# Patient Record
Sex: FEMALE | Race: WHITE | Employment: STUDENT | URBAN - METROPOLITAN AREA
[De-identification: names, ages, dates, MRNs, and addresses within clinical notes are randomized per-mention and may not be internally consistent; named-entity substitution may affect disease eponyms.]

---
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
## 2022-01-07 ENCOUNTER — HOSPITAL ENCOUNTER (OUTPATIENT)
Age: 22
Setting detail: SPECIMEN
Discharge: HOME | End: 2022-01-07
Payer: COMMERCIAL

## 2022-01-07 PROCEDURE — 81378 HLA I & II TYPING HR: CPT

## 2022-02-18 ENCOUNTER — HOSPITAL ENCOUNTER (OUTPATIENT)
Dept: ONCOLOGY | Age: 22
Setting detail: INFUSION SERIES
Discharge: HOME | End: 2022-02-18
Payer: COMMERCIAL

## 2022-02-18 VITALS
DIASTOLIC BLOOD PRESSURE: 76 MMHG | WEIGHT: 188.27 LBS | RESPIRATION RATE: 18 BRPM | HEIGHT: 66 IN | SYSTOLIC BLOOD PRESSURE: 132 MMHG | BODY MASS INDEX: 30.26 KG/M2 | TEMPERATURE: 98.3 F | OXYGEN SATURATION: 95 % | HEART RATE: 92 BPM

## 2022-02-18 LAB
A/G RATIO: 1.5 (ref 1.1–2.2)
ABO/RH: NORMAL
ALBUMIN SERPL-MCNC: 4.6 G/DL (ref 3.4–5)
ALP BLD-CCNC: 91 U/L (ref 40–129)
ALT SERPL-CCNC: 27 U/L (ref 10–40)
ANION GAP SERPL CALCULATED.3IONS-SCNC: 11 MMOL/L (ref 3–16)
ANTIBODY SCREEN: NORMAL
APTT: 32.6 SEC (ref 26.2–38.6)
AST SERPL-CCNC: 24 U/L (ref 15–37)
BASOPHILS ABSOLUTE: 0.1 K/UL (ref 0–0.2)
BASOPHILS RELATIVE PERCENT: 1 %
BILIRUB SERPL-MCNC: 0.3 MG/DL (ref 0–1)
BILIRUBIN DIRECT: <0.2 MG/DL (ref 0–0.3)
BILIRUBIN, INDIRECT: NORMAL MG/DL (ref 0–1)
BUN BLDV-MCNC: 15 MG/DL (ref 7–20)
CALCIUM SERPL-MCNC: 10.1 MG/DL (ref 8.3–10.6)
CHLORIDE BLD-SCNC: 103 MMOL/L (ref 99–110)
CO2: 24 MMOL/L (ref 21–32)
CREAT SERPL-MCNC: 0.6 MG/DL (ref 0.6–1.1)
EOSINOPHILS ABSOLUTE: 0.1 K/UL (ref 0–0.6)
EOSINOPHILS RELATIVE PERCENT: 1.7 %
GFR AFRICAN AMERICAN: >60
GFR NON-AFRICAN AMERICAN: >60
GLUCOSE BLD-MCNC: 120 MG/DL (ref 70–99)
HAV IGM SER IA-ACNC: NORMAL
HBV SURFACE AB TITR SER: <3.5 MIU/ML
HCG QUALITATIVE: NEGATIVE
HCT VFR BLD CALC: 39.8 % (ref 36–48)
HEMOGLOBIN: 13.9 G/DL (ref 12–16)
INR BLD: 1.11 (ref 0.88–1.12)
LACTATE DEHYDROGENASE: 221 U/L (ref 100–190)
LYMPHOCYTES ABSOLUTE: 2.2 K/UL (ref 1–5.1)
LYMPHOCYTES RELATIVE PERCENT: 37.1 %
MCH RBC QN AUTO: 30 PG (ref 26–34)
MCHC RBC AUTO-ENTMCNC: 34.8 G/DL (ref 31–36)
MCV RBC AUTO: 86.2 FL (ref 80–100)
MONOCYTES ABSOLUTE: 0.4 K/UL (ref 0–1.3)
MONOCYTES RELATIVE PERCENT: 6.9 %
NEUTROPHILS ABSOLUTE: 3.2 K/UL (ref 1.7–7.7)
NEUTROPHILS RELATIVE PERCENT: 53.3 %
PDW BLD-RTO: 12.5 % (ref 12.4–15.4)
PHOSPHORUS: 3.2 MG/DL (ref 2.5–4.9)
PLATELET # BLD: 256 K/UL (ref 135–450)
PMV BLD AUTO: 9.6 FL (ref 5–10.5)
POTASSIUM REFLEX MAGNESIUM: 3.9 MMOL/L (ref 3.5–5.1)
PROTHROMBIN TIME: 12.6 SEC (ref 9.9–12.7)
RBC # BLD: 4.62 M/UL (ref 4–5.2)
SODIUM BLD-SCNC: 138 MMOL/L (ref 136–145)
TOTAL PROTEIN: 7.6 G/DL (ref 6.4–8.2)
URIC ACID, SERUM: 4.1 MG/DL (ref 2.6–6)
WBC # BLD: 6 K/UL (ref 4–11)

## 2022-02-18 PROCEDURE — 86663 EPSTEIN-BARR ANTIBODY: CPT

## 2022-02-18 PROCEDURE — 86707 HEPATITIS BE ANTIBODY: CPT

## 2022-02-18 PROCEDURE — 84100 ASSAY OF PHOSPHORUS: CPT

## 2022-02-18 PROCEDURE — 86850 RBC ANTIBODY SCREEN: CPT

## 2022-02-18 PROCEDURE — 86778 TOXOPLASMA ANTIBODY IGM: CPT

## 2022-02-18 PROCEDURE — 86645 CMV ANTIBODY IGM: CPT

## 2022-02-18 PROCEDURE — 85660 RBC SICKLE CELL TEST: CPT

## 2022-02-18 PROCEDURE — 86709 HEPATITIS A IGM ANTIBODY: CPT

## 2022-02-18 PROCEDURE — 86694 HERPES SIMPLEX NES ANTBDY: CPT

## 2022-02-18 PROCEDURE — 81371 HLA I & II TYPE VERIFY LR: CPT

## 2022-02-18 PROCEDURE — 86787 VARICELLA-ZOSTER ANTIBODY: CPT

## 2022-02-18 PROCEDURE — 86833 HLA CLASS II HIGH DEFIN QUAL: CPT

## 2022-02-18 PROCEDURE — 83615 LACTATE (LD) (LDH) ENZYME: CPT

## 2022-02-18 PROCEDURE — 82248 BILIRUBIN DIRECT: CPT

## 2022-02-18 PROCEDURE — 85610 PROTHROMBIN TIME: CPT

## 2022-02-18 PROCEDURE — 86777 TOXOPLASMA ANTIBODY: CPT

## 2022-02-18 PROCEDURE — 86664 EPSTEIN-BARR NUCLEAR ANTIGEN: CPT

## 2022-02-18 PROCEDURE — 86900 BLOOD TYPING SEROLOGIC ABO: CPT

## 2022-02-18 PROCEDURE — 80053 COMPREHEN METABOLIC PANEL: CPT

## 2022-02-18 PROCEDURE — 85025 COMPLETE CBC W/AUTO DIFF WBC: CPT

## 2022-02-18 PROCEDURE — 86901 BLOOD TYPING SEROLOGIC RH(D): CPT

## 2022-02-18 PROCEDURE — 85730 THROMBOPLASTIN TIME PARTIAL: CPT

## 2022-02-18 PROCEDURE — 86706 HEP B SURFACE ANTIBODY: CPT

## 2022-02-18 PROCEDURE — 86832 HLA CLASS I HIGH DEFIN QUAL: CPT

## 2022-02-18 PROCEDURE — 84550 ASSAY OF BLOOD/URIC ACID: CPT

## 2022-02-18 PROCEDURE — 86665 EPSTEIN-BARR CAPSID VCA: CPT

## 2022-02-18 PROCEDURE — 84703 CHORIONIC GONADOTROPIN ASSAY: CPT

## 2022-02-18 NOTE — ONCOLOGY
RN met this donor in outpt infusion for teach regarding donation of stem cells to her grandfather. Donor was educated on process for collecting PBSC and donor harvest.   All questions were answered to her satisfaction. All lab work drawn blood samples urine sample. FDA donor panel and [place] labs. Transplant coordinator did discuss possibilty of donor needing to collect a auto unit of blood prior to a harvest.  TC will be in touch with donor re: future scheduling/plans.

## 2022-02-19 LAB
CYTOMEGALOVIRUS IGM ANTIBODY: <8 AU/ML
EPSTEIN BARR VIRUS NUCLEAR AB IGG: 246 U/ML (ref 0–21.9)
EPSTEIN-BARR EARLY ANTIGEN ANTIBODY: 11.2 U/ML (ref 0–10.9)
EPSTEIN-BARR VCA IGG: 310 U/ML (ref 0–21.9)
EPSTEIN-BARR VCA IGM: <10 U/ML (ref 0–43.9)
TOXOPLASMA GONDI AB IGM: <3 AU/ML
TOXOPLASMA GONDII AB IGG: <3 IU/ML
VARICELLA-ZOSTER VIRUS AB, IGG: NORMAL

## 2022-02-20 LAB
HEPATITIS BE ANTIBODY: NEGATIVE
HERPES TYPE 1/2 IGM COMBINED: 0.77 IV
VARICELLA ZOSTER AB IGM: 0.06 ISR

## 2022-02-21 LAB — MISCELLANEOUS LAB TEST ORDER: NORMAL

## 2022-02-25 LAB — HERPES TYPE I/II IGG COMBINED: 19.3 IV

## 2022-03-14 ENCOUNTER — HOSPITAL ENCOUNTER (OUTPATIENT)
Dept: ONCOLOGY | Age: 22
Setting detail: INFUSION SERIES
Discharge: HOME | End: 2022-03-14
Payer: COMMERCIAL

## 2022-03-14 LAB
BILIRUBIN URINE: NEGATIVE
BLOOD, URINE: NEGATIVE
CLARITY: CLEAR
COLOR: YELLOW
GLUCOSE URINE: NEGATIVE MG/DL
KETONES, URINE: NEGATIVE MG/DL
LEUKOCYTE ESTERASE, URINE: NEGATIVE
MICROSCOPIC EXAMINATION: NORMAL
NITRITE, URINE: NEGATIVE
PH UA: 6 (ref 5–8)
PROTEIN UA: NEGATIVE MG/DL
SPECIFIC GRAVITY UA: >=1.03 (ref 1–1.03)
URINE TYPE: NORMAL
UROBILINOGEN, URINE: 0.2 E.U./DL

## 2022-03-14 PROCEDURE — 81003 URINALYSIS AUTO W/O SCOPE: CPT

## 2022-03-17 NOTE — PROGRESS NOTES
Called [facility] and verified last vist for pt was 2-18-22. Called and left message for Berenice Ernst for H&P info. /MA    3/21 No new H&P received. [facility] for Berenice Ernst. Please fax H&P within 30 days or call to notify of plan for H&P. Be the Match Form - states Covid vacc. Hank Renteria    3/21 [address], from transplant Center called back. She stated patient has not and will not be seen in their office again before surgery. H&P to be done DOS per Berenice Ernst.  Hank Renteria

## 2022-03-17 NOTE — PROGRESS NOTES
Place patient label inside box (if no patient label, complete below)  Name:  :  MR#:             Smith Lopez / PROCEDURE  1. I (we), Magaly Nichols (Patient Name) authorize DR. FARRELL [address] [place] [address] (Provider / Earl Lovett) and/or such assistants as may be selected by him/her, to perform the following operation/procedure(s): BONE MARROW HARVEST       Note: If unable to obtain consent prior to an emergent procedure, document the emergent reason in the medical record. This procedure has been explained to my (our) satisfaction and included in the explanation was:  A) The intended benefit, nature, and extent of the procedure to be performed;  B) The significant risks involved and the probability of success;  C) Alternative procedures and methods of treatment;  D) The dangers and probable consequences of such alternatives (including no procedure or treatment); E) The expected consequences of the procedure on my future health;  F) Whether other qualified individuals would be performing important surgical tasks and/or whether  would be present to advise or support the procedure. I (we) understand that there are other risks of infection and other serious complications in the pre-operative/procedural and postoperative/procedural stages of my (our) care. I (we) have asked all of the questions which I (we) thought were important in deciding whether or not to undergo treatment or diagnosis. These questions have been answered to my (our) satisfaction. I (we) understand that no assurance can be given that the procedure will be a success, and no guarantee or warranty of success has been given to me (us). 2. It has been explained to me (us) that during the course of the operation/procedure, unforeseen conditions may be revealed that necessitate extension of the original procedure(s) or different procedure(s) than those set forth in Paragraph 1.  I (we) authorize and request that the above-named physician, his/her assistants or his/her designees, perform procedures as necessary and desirable if deemed to be in my (our) best interest.     Revised 8/2/2021                                                                          Page 1 of 2       3. I acknowledge that health care personnel may be observing this procedure for the purpose of medical education or other specified purposes as may be necessary as requested and/or approved by my (our) physician. 4. I (we) consent to the disposal by the hospital Pathologist of the removed tissue, parts or organs in accordance with hospital policy. 5. I do ____ do not ____ consent to the use of a local infiltration pain blocking agent that will be used by my provider/surgical provider to help alleviate pain during my procedure. 6. I do ____ do not ____ consent to an emergent blood transfusion in the case of a life-threatening situation that requires blood components to be administered. This consent is valid for 24 hours from the beginning of the procedure. 7. This patient does ____ or does not ____ currently have a DNR status/order. If DNR order is in place, obtain Addendum to the Surgical Consent for ALL Patients with a DNR Order to address kristy-operative status for limited intervention or DNR suspension.      8. I have read and fully understand the above Consent for Operation/Procedure and that all blanks were completed before I signed the consent.   _____________________________       _____________________      ____/____am/pm  Signature of Patient or legal representative      Printed Name / Relationship            Date / Time   ____________________________       _____________________      ____/____am/pm  Witness to Signature                                    Printed Name                    Date / Time     If patient is unable to sign or is a minor, complete the following)  Patient is a minor, ____ years of age, or unable to sign because:   ______________________________________________________________________________________________    Sadie Current If a phone consent is obtained, consent will be documented by using two health care professionals, each affirming that the consenting party has no questions and gives consent for the procedure discussed with the physician/provider.   _____________________          ____________________       _____/_____am/pm   2nd witness to phone consent        Printed name           Date / Time    Informed Consent:  I have provided the explanation described above in section 1 to the patient and/or legal representative.  I have provided the patient and/or legal representative with an opportunity to ask any questions about the proposed operation/procedure.   ___________________________          ____________________         ____/____am/pm  Provider / Proceduralist                            Printed name            Date / Time  Revised 8/2/2021                                                                      Page 2 of 2

## 2022-03-17 NOTE — PROGRESS NOTES
PRE-OP INSTRUCTIONS FOR THE SURGICAL PATIENT YOU ARE UNABLE TO MAKE CONTACT FOR AN INTERVIEW:    All patients having surgery or anesthesia are required to be Covid tested OR to have been vaccinated at least 14 days prior to your procedure. It is very important to return our call to [phone] and notify the staff of your last vaccination date otherwise you will be required to complete Covid PCR test within the 5-6 days prior to surgery & quarantine. The results will need to be faxed to PreAdmission Testing at [phone]. 1. Follow all instructions provided to you from your surgeons office, including your ARRIVAL TIME. 2. Enter the MAIN entrance located on [address] and report to the desk. 3. Bring your insurance & photo ID with you. You may also be asked to pay a co-pay, as you may want to bring a check or credit card with you. 4. Leave all other valuables at home. 5. Arrange for someone to drive you home and be with you for the first 24 hours after discharge. 6. Bring your medication list with you day of surgery with doses and frequency listed (including over the counter medications)  7. You must contact your surgeon for Instructions regarding:              - ALL medication instructions, especially if taking blood thinners, aspirin, or diabetic medication.         -Bariatric patients call surgeon if on diabetic medications as some need to be stopped 1 week preop  - IF  there is a change in your physical condition such as a cold, fever, rash, cuts, sores or any other infection, especially near your surgical site. 8. A Pre-op History and Physical for surgery MUST be completed by your Physician or an Urgent Care within 30 days of your procedure date. Please bring a copy with you on the day of your procedure and along with any other testing performed. 9. DO NOT EAT ANYTHING eight hours prior to arrival for surgery.   May have up to 8 ounces of water 4 hours prior to

## 2022-03-22 ENCOUNTER — ANESTHESIA EVENT (OUTPATIENT)
Dept: OPERATING ROOM | Age: 22
End: 2022-03-22
Payer: COMMERCIAL

## 2022-03-22 NOTE — ONCOLOGY
Pt notified via telephone that the auto unit of blood she donated is unable to be used for tomorrow's procedure due to contamination. Dr. Sanjuanita Frias notified. No changes to the plan for tomorrow. Donor will register at [address] [place] [address] Rd., [address] for 0830 OR time.

## 2022-03-23 ENCOUNTER — HOSPITAL ENCOUNTER (OUTPATIENT)
Age: 22
Setting detail: OUTPATIENT SURGERY
Discharge: HOME | End: 2022-03-23
Attending: INTERNAL MEDICINE | Admitting: INTERNAL MEDICINE
Payer: COMMERCIAL

## 2022-03-23 ENCOUNTER — ANESTHESIA (OUTPATIENT)
Dept: OPERATING ROOM | Age: 22
End: 2022-03-23
Payer: COMMERCIAL

## 2022-03-23 VITALS
RESPIRATION RATE: 17 BRPM | OXYGEN SATURATION: 100 % | DIASTOLIC BLOOD PRESSURE: 44 MMHG | SYSTOLIC BLOOD PRESSURE: 86 MMHG | TEMPERATURE: 62.1 F

## 2022-03-23 VITALS
HEART RATE: 88 BPM | RESPIRATION RATE: 17 BRPM | WEIGHT: 188.2 LBS | HEIGHT: 66 IN | BODY MASS INDEX: 30.25 KG/M2 | SYSTOLIC BLOOD PRESSURE: 114 MMHG | OXYGEN SATURATION: 97 % | TEMPERATURE: 97.2 F | DIASTOLIC BLOOD PRESSURE: 55 MMHG

## 2022-03-23 DIAGNOSIS — Z52.3 BONE MARROW DONOR: Primary | ICD-10-CM

## 2022-03-23 LAB
ABO/RH: NORMAL
ANTIBODY SCREEN: NORMAL
BASOPHILS ABSOLUTE: 0 K/UL (ref 0–0.2)
BASOPHILS ABSOLUTE: 0.1 K/UL (ref 0–0.2)
BASOPHILS ABSOLUTE: 0.1 K/UL (ref 0–0.2)
BASOPHILS RELATIVE PERCENT: 0.1 %
BASOPHILS RELATIVE PERCENT: 1 %
BASOPHILS RELATIVE PERCENT: 1.1 %
EOSINOPHILS ABSOLUTE: 0 K/UL (ref 0–0.6)
EOSINOPHILS ABSOLUTE: 0.1 K/UL (ref 0–0.6)
EOSINOPHILS ABSOLUTE: 0.1 K/UL (ref 0–0.6)
EOSINOPHILS RELATIVE PERCENT: 0 %
EOSINOPHILS RELATIVE PERCENT: 1.4 %
EOSINOPHILS RELATIVE PERCENT: 1.5 %
GLUCOSE BLD-MCNC: 152 MG/DL (ref 70–99)
HCT VFR BLD CALC: 25.6 % (ref 36–48)
HCT VFR BLD CALC: 28.1 % (ref 36–48)
HCT VFR BLD CALC: 33.7 % (ref 36–48)
HEMOGLOBIN: 11.8 G/DL (ref 12–16)
HEMOGLOBIN: 8.7 G/DL (ref 12–16)
HEMOGLOBIN: 9.6 G/DL (ref 12–16)
LYMPHOCYTES ABSOLUTE: 0.8 K/UL (ref 1–5.1)
LYMPHOCYTES ABSOLUTE: 2.5 K/UL (ref 1–5.1)
LYMPHOCYTES ABSOLUTE: 3 K/UL (ref 1–5.1)
LYMPHOCYTES RELATIVE PERCENT: 44.1 %
LYMPHOCYTES RELATIVE PERCENT: 44.4 %
LYMPHOCYTES RELATIVE PERCENT: 6.1 %
MCH RBC QN AUTO: 28.9 PG (ref 26–34)
MCH RBC QN AUTO: 28.9 PG (ref 26–34)
MCH RBC QN AUTO: 29.5 PG (ref 26–34)
MCHC RBC AUTO-ENTMCNC: 34.1 G/DL (ref 31–36)
MCHC RBC AUTO-ENTMCNC: 34.3 G/DL (ref 31–36)
MCHC RBC AUTO-ENTMCNC: 35 G/DL (ref 31–36)
MCV RBC AUTO: 84.4 FL (ref 80–100)
MCV RBC AUTO: 84.4 FL (ref 80–100)
MCV RBC AUTO: 84.8 FL (ref 80–100)
MONOCYTES ABSOLUTE: 0.2 K/UL (ref 0–1.3)
MONOCYTES ABSOLUTE: 0.4 K/UL (ref 0–1.3)
MONOCYTES ABSOLUTE: 0.5 K/UL (ref 0–1.3)
MONOCYTES RELATIVE PERCENT: 1.4 %
MONOCYTES RELATIVE PERCENT: 7.3 %
MONOCYTES RELATIVE PERCENT: 7.6 %
NEUTROPHILS ABSOLUTE: 12.3 K/UL (ref 1.7–7.7)
NEUTROPHILS ABSOLUTE: 2.6 K/UL (ref 1.7–7.7)
NEUTROPHILS ABSOLUTE: 3.1 K/UL (ref 1.7–7.7)
NEUTROPHILS RELATIVE PERCENT: 45.8 %
NEUTROPHILS RELATIVE PERCENT: 45.8 %
NEUTROPHILS RELATIVE PERCENT: 92.4 %
PDW BLD-RTO: 12.5 % (ref 12.4–15.4)
PDW BLD-RTO: 12.5 % (ref 12.4–15.4)
PDW BLD-RTO: 12.7 % (ref 12.4–15.4)
PERFORMED ON: ABNORMAL
PLATELET # BLD: 231 K/UL (ref 135–450)
PLATELET # BLD: 271 K/UL (ref 135–450)
PLATELET # BLD: 272 K/UL (ref 135–450)
PMV BLD AUTO: 8.7 FL (ref 5–10.5)
PMV BLD AUTO: 9 FL (ref 5–10.5)
PMV BLD AUTO: 9.3 FL (ref 5–10.5)
PREGNANCY, URINE: NEGATIVE
RBC # BLD: 3.01 M/UL (ref 4–5.2)
RBC # BLD: 3.33 M/UL (ref 4–5.2)
RBC # BLD: 3.99 M/UL (ref 4–5.2)
WBC # BLD: 13.3 K/UL (ref 4–11)
WBC # BLD: 5.7 K/UL (ref 4–11)
WBC # BLD: 6.7 K/UL (ref 4–11)

## 2022-03-23 PROCEDURE — 2709999900 HC NON-CHARGEABLE SUPPLY: Performed by: INTERNAL MEDICINE

## 2022-03-23 PROCEDURE — 3600000012 HC SURGERY LEVEL 2 ADDTL 15MIN: Performed by: INTERNAL MEDICINE

## 2022-03-23 PROCEDURE — 6360000002 HC RX W HCPCS: Performed by: NURSE ANESTHETIST, CERTIFIED REGISTERED

## 2022-03-23 PROCEDURE — 85025 COMPLETE CBC W/AUTO DIFF WBC: CPT

## 2022-03-23 PROCEDURE — 7100000000 HC PACU RECOVERY - FIRST 15 MIN: Performed by: INTERNAL MEDICINE

## 2022-03-23 PROCEDURE — 2500000003 HC RX 250 WO HCPCS: Performed by: NURSE ANESTHETIST, CERTIFIED REGISTERED

## 2022-03-23 PROCEDURE — 7100000011 HC PHASE II RECOVERY - ADDTL 15 MIN: Performed by: INTERNAL MEDICINE

## 2022-03-23 PROCEDURE — 6360000002 HC RX W HCPCS: Performed by: NURSE PRACTITIONER

## 2022-03-23 PROCEDURE — 84703 CHORIONIC GONADOTROPIN ASSAY: CPT

## 2022-03-23 PROCEDURE — 6370000000 HC RX 637 (ALT 250 FOR IP): Performed by: FAMILY MEDICINE

## 2022-03-23 PROCEDURE — 3700000001 HC ADD 15 MINUTES (ANESTHESIA): Performed by: INTERNAL MEDICINE

## 2022-03-23 PROCEDURE — 7100000001 HC PACU RECOVERY - ADDTL 15 MIN: Performed by: INTERNAL MEDICINE

## 2022-03-23 PROCEDURE — 86900 BLOOD TYPING SEROLOGIC ABO: CPT

## 2022-03-23 PROCEDURE — 7100000010 HC PHASE II RECOVERY - FIRST 15 MIN: Performed by: INTERNAL MEDICINE

## 2022-03-23 PROCEDURE — 3600000002 HC SURGERY LEVEL 2 BASE: Performed by: INTERNAL MEDICINE

## 2022-03-23 PROCEDURE — 86850 RBC ANTIBODY SCREEN: CPT

## 2022-03-23 PROCEDURE — 3700000000 HC ANESTHESIA ATTENDED CARE: Performed by: INTERNAL MEDICINE

## 2022-03-23 PROCEDURE — 86901 BLOOD TYPING SEROLOGIC RH(D): CPT

## 2022-03-23 PROCEDURE — 2580000003 HC RX 258: Performed by: ANESTHESIOLOGY

## 2022-03-23 PROCEDURE — 6360000002 HC RX W HCPCS: Performed by: FAMILY MEDICINE

## 2022-03-23 RX ORDER — PROPOFOL 10 MG/ML
INJECTION, EMULSION INTRAVENOUS PRN
Status: DISCONTINUED | OUTPATIENT
Start: 2022-03-23 | End: 2022-03-23 | Stop reason: SDUPTHER

## 2022-03-23 RX ORDER — ONDANSETRON 2 MG/ML
INJECTION INTRAMUSCULAR; INTRAVENOUS PRN
Status: DISCONTINUED | OUTPATIENT
Start: 2022-03-23 | End: 2022-03-23 | Stop reason: SDUPTHER

## 2022-03-23 RX ORDER — ONDANSETRON 4 MG/1
4 TABLET, ORALLY DISINTEGRATING ORAL EVERY 8 HOURS PRN
Status: DISCONTINUED | OUTPATIENT
Start: 2022-03-23 | End: 2022-03-23 | Stop reason: HOSPADM

## 2022-03-23 RX ORDER — OXYCODONE HYDROCHLORIDE AND ACETAMINOPHEN 5; 325 MG/1; MG/1
1 TABLET ORAL EVERY 4 HOURS PRN
Status: DISCONTINUED | OUTPATIENT
Start: 2022-03-23 | End: 2022-03-23 | Stop reason: HOSPADM

## 2022-03-23 RX ORDER — SODIUM CHLORIDE 0.9 % (FLUSH) 0.9 %
5-40 SYRINGE (ML) INJECTION PRN
Status: DISCONTINUED | OUTPATIENT
Start: 2022-03-23 | End: 2022-03-23 | Stop reason: HOSPADM

## 2022-03-23 RX ORDER — SODIUM CHLORIDE, SODIUM LACTATE, POTASSIUM CHLORIDE, CALCIUM CHLORIDE 600; 310; 30; 20 MG/100ML; MG/100ML; MG/100ML; MG/100ML
INJECTION, SOLUTION INTRAVENOUS CONTINUOUS
Status: DISCONTINUED | OUTPATIENT
Start: 2022-03-23 | End: 2022-03-23 | Stop reason: HOSPADM

## 2022-03-23 RX ORDER — DEXAMETHASONE SODIUM PHOSPHATE 4 MG/ML
INJECTION, SOLUTION INTRA-ARTICULAR; INTRALESIONAL; INTRAMUSCULAR; INTRAVENOUS; SOFT TISSUE PRN
Status: DISCONTINUED | OUTPATIENT
Start: 2022-03-23 | End: 2022-03-23 | Stop reason: SDUPTHER

## 2022-03-23 RX ORDER — FENTANYL CITRATE 50 UG/ML
25 INJECTION, SOLUTION INTRAMUSCULAR; INTRAVENOUS EVERY 5 MIN PRN
Status: DISCONTINUED | OUTPATIENT
Start: 2022-03-23 | End: 2022-03-23 | Stop reason: HOSPADM

## 2022-03-23 RX ORDER — ACETAMINOPHEN 500 MG
1000 TABLET ORAL
Status: COMPLETED | OUTPATIENT
Start: 2022-03-23 | End: 2022-03-23

## 2022-03-23 RX ORDER — LIDOCAINE HYDROCHLORIDE 20 MG/ML
INJECTION, SOLUTION INTRAVENOUS PRN
Status: DISCONTINUED | OUTPATIENT
Start: 2022-03-23 | End: 2022-03-23 | Stop reason: SDUPTHER

## 2022-03-23 RX ORDER — SODIUM CHLORIDE 0.9 % (FLUSH) 0.9 %
5-40 SYRINGE (ML) INJECTION EVERY 12 HOURS SCHEDULED
Status: DISCONTINUED | OUTPATIENT
Start: 2022-03-23 | End: 2022-03-23 | Stop reason: HOSPADM

## 2022-03-23 RX ORDER — OXYCODONE HYDROCHLORIDE AND ACETAMINOPHEN 5; 325 MG/1; MG/1
2 TABLET ORAL EVERY 4 HOURS PRN
Status: DISCONTINUED | OUTPATIENT
Start: 2022-03-23 | End: 2022-03-23 | Stop reason: HOSPADM

## 2022-03-23 RX ORDER — MORPHINE SULFATE 4 MG/ML
4 INJECTION, SOLUTION INTRAMUSCULAR; INTRAVENOUS
Status: DISCONTINUED | OUTPATIENT
Start: 2022-03-23 | End: 2022-03-23 | Stop reason: HOSPADM

## 2022-03-23 RX ORDER — MORPHINE SULFATE 4 MG/ML
2 INJECTION, SOLUTION INTRAMUSCULAR; INTRAVENOUS
Status: DISCONTINUED | OUTPATIENT
Start: 2022-03-23 | End: 2022-03-23 | Stop reason: HOSPADM

## 2022-03-23 RX ORDER — LORAZEPAM 2 MG/ML
0.5 INJECTION INTRAMUSCULAR
Status: DISCONTINUED | OUTPATIENT
Start: 2022-03-23 | End: 2022-03-23 | Stop reason: HOSPADM

## 2022-03-23 RX ORDER — SCOLOPAMINE TRANSDERMAL SYSTEM 1 MG/1
1 PATCH, EXTENDED RELEASE TRANSDERMAL
Status: DISCONTINUED | OUTPATIENT
Start: 2022-03-23 | End: 2022-03-23 | Stop reason: HOSPADM

## 2022-03-23 RX ORDER — OXYCODONE HYDROCHLORIDE 5 MG/1
5 TABLET ORAL PRN
Status: DISCONTINUED | OUTPATIENT
Start: 2022-03-23 | End: 2022-03-23 | Stop reason: HOSPADM

## 2022-03-23 RX ORDER — FENTANYL CITRATE 50 UG/ML
INJECTION, SOLUTION INTRAMUSCULAR; INTRAVENOUS PRN
Status: DISCONTINUED | OUTPATIENT
Start: 2022-03-23 | End: 2022-03-23 | Stop reason: SDUPTHER

## 2022-03-23 RX ORDER — DIPHENHYDRAMINE HYDROCHLORIDE 50 MG/ML
12.5 INJECTION INTRAMUSCULAR; INTRAVENOUS
Status: DISCONTINUED | OUTPATIENT
Start: 2022-03-23 | End: 2022-03-23 | Stop reason: HOSPADM

## 2022-03-23 RX ORDER — SODIUM CHLORIDE 9 MG/ML
25 INJECTION, SOLUTION INTRAVENOUS PRN
Status: DISCONTINUED | OUTPATIENT
Start: 2022-03-23 | End: 2022-03-23 | Stop reason: HOSPADM

## 2022-03-23 RX ORDER — LABETALOL HYDROCHLORIDE 5 MG/ML
10 INJECTION, SOLUTION INTRAVENOUS
Status: DISCONTINUED | OUTPATIENT
Start: 2022-03-23 | End: 2022-03-23 | Stop reason: HOSPADM

## 2022-03-23 RX ORDER — SODIUM CHLORIDE 9 MG/ML
INJECTION, SOLUTION INTRAVENOUS CONTINUOUS
Status: DISCONTINUED | OUTPATIENT
Start: 2022-03-23 | End: 2022-03-23 | Stop reason: HOSPADM

## 2022-03-23 RX ORDER — MEDROXYPROGESTERONE ACETATE 150 MG/ML
150 INJECTION, SUSPENSION INTRAMUSCULAR
COMMUNITY

## 2022-03-23 RX ORDER — ONDANSETRON 2 MG/ML
4 INJECTION INTRAMUSCULAR; INTRAVENOUS
Status: COMPLETED | OUTPATIENT
Start: 2022-03-23 | End: 2022-03-23

## 2022-03-23 RX ORDER — MIDAZOLAM HYDROCHLORIDE 1 MG/ML
INJECTION INTRAMUSCULAR; INTRAVENOUS PRN
Status: DISCONTINUED | OUTPATIENT
Start: 2022-03-23 | End: 2022-03-23 | Stop reason: SDUPTHER

## 2022-03-23 RX ORDER — OXYCODONE HYDROCHLORIDE AND ACETAMINOPHEN 5; 325 MG/1; MG/1
1 TABLET ORAL EVERY 6 HOURS PRN
Qty: 28 TABLET | Refills: 0 | Status: SHIPPED | OUTPATIENT
Start: 2022-03-23 | End: 2022-03-23 | Stop reason: SDUPTHER

## 2022-03-23 RX ORDER — SUCCINYLCHOLINE/SOD CL,ISO/PF 200MG/10ML
SYRINGE (ML) INTRAVENOUS PRN
Status: DISCONTINUED | OUTPATIENT
Start: 2022-03-23 | End: 2022-03-23 | Stop reason: SDUPTHER

## 2022-03-23 RX ORDER — ONDANSETRON 2 MG/ML
4 INJECTION INTRAMUSCULAR; INTRAVENOUS EVERY 6 HOURS PRN
Status: DISCONTINUED | OUTPATIENT
Start: 2022-03-23 | End: 2022-03-23 | Stop reason: HOSPADM

## 2022-03-23 RX ORDER — OXYCODONE HYDROCHLORIDE 5 MG/1
10 TABLET ORAL PRN
Status: DISCONTINUED | OUTPATIENT
Start: 2022-03-23 | End: 2022-03-23 | Stop reason: HOSPADM

## 2022-03-23 RX ORDER — MEPERIDINE HYDROCHLORIDE 25 MG/ML
12.5 INJECTION INTRAMUSCULAR; INTRAVENOUS; SUBCUTANEOUS EVERY 5 MIN PRN
Status: DISCONTINUED | OUTPATIENT
Start: 2022-03-23 | End: 2022-03-23 | Stop reason: HOSPADM

## 2022-03-23 RX ORDER — KETOROLAC TROMETHAMINE 30 MG/ML
INJECTION, SOLUTION INTRAMUSCULAR; INTRAVENOUS PRN
Status: DISCONTINUED | OUTPATIENT
Start: 2022-03-23 | End: 2022-03-23 | Stop reason: SDUPTHER

## 2022-03-23 RX ORDER — OXYCODONE HYDROCHLORIDE AND ACETAMINOPHEN 5; 325 MG/1; MG/1
1 TABLET ORAL EVERY 6 HOURS PRN
Qty: 10 TABLET | Refills: 0 | Status: SHIPPED | OUTPATIENT
Start: 2022-03-23 | End: 2022-03-26

## 2022-03-23 RX ADMIN — PHENYLEPHRINE HYDROCHLORIDE 150 MCG: 10 INJECTION, SOLUTION INTRAMUSCULAR; INTRAVENOUS; SUBCUTANEOUS at 09:11

## 2022-03-23 RX ADMIN — ONDANSETRON 4 MG: 2 INJECTION INTRAMUSCULAR; INTRAVENOUS at 08:37

## 2022-03-23 RX ADMIN — DEXAMETHASONE SODIUM PHOSPHATE 4 MG: 4 INJECTION, SOLUTION INTRAMUSCULAR; INTRAVENOUS at 08:37

## 2022-03-23 RX ADMIN — ACETAMINOPHEN 1000 MG: 500 TABLET ORAL at 14:25

## 2022-03-23 RX ADMIN — HYDROMORPHONE HYDROCHLORIDE 0.5 MG: 1 INJECTION, SOLUTION INTRAMUSCULAR; INTRAVENOUS; SUBCUTANEOUS at 10:10

## 2022-03-23 RX ADMIN — PHENYLEPHRINE HYDROCHLORIDE 100 MCG: 10 INJECTION, SOLUTION INTRAMUSCULAR; INTRAVENOUS; SUBCUTANEOUS at 08:47

## 2022-03-23 RX ADMIN — PHENYLEPHRINE HYDROCHLORIDE 100 MCG: 10 INJECTION, SOLUTION INTRAMUSCULAR; INTRAVENOUS; SUBCUTANEOUS at 08:58

## 2022-03-23 RX ADMIN — FENTANYL CITRATE 100 MCG: 50 INJECTION, SOLUTION INTRAMUSCULAR; INTRAVENOUS at 08:54

## 2022-03-23 RX ADMIN — ONDANSETRON 4 MG: 2 INJECTION INTRAMUSCULAR; INTRAVENOUS at 14:18

## 2022-03-23 RX ADMIN — SODIUM CHLORIDE, POTASSIUM CHLORIDE, SODIUM LACTATE AND CALCIUM CHLORIDE: 600; 310; 30; 20 INJECTION, SOLUTION INTRAVENOUS at 07:53

## 2022-03-23 RX ADMIN — HYDROMORPHONE HYDROCHLORIDE 0.5 MG: 1 INJECTION, SOLUTION INTRAMUSCULAR; INTRAVENOUS; SUBCUTANEOUS at 10:04

## 2022-03-23 RX ADMIN — PHENYLEPHRINE HYDROCHLORIDE 200 MCG: 10 INJECTION, SOLUTION INTRAMUSCULAR; INTRAVENOUS; SUBCUTANEOUS at 09:40

## 2022-03-23 RX ADMIN — PHENYLEPHRINE HYDROCHLORIDE 150 MCG: 10 INJECTION, SOLUTION INTRAMUSCULAR; INTRAVENOUS; SUBCUTANEOUS at 09:05

## 2022-03-23 RX ADMIN — FENTANYL CITRATE 100 MCG: 50 INJECTION, SOLUTION INTRAMUSCULAR; INTRAVENOUS at 08:33

## 2022-03-23 RX ADMIN — MIDAZOLAM HYDROCHLORIDE 2 MG: 2 INJECTION, SOLUTION INTRAMUSCULAR; INTRAVENOUS at 08:26

## 2022-03-23 RX ADMIN — ONDANSETRON 4 MG: 2 INJECTION INTRAMUSCULAR; INTRAVENOUS at 11:01

## 2022-03-23 RX ADMIN — MEPERIDINE HYDROCHLORIDE 12.5 MG: 25 INJECTION INTRAMUSCULAR; INTRAVENOUS; SUBCUTANEOUS at 09:56

## 2022-03-23 RX ADMIN — KETOROLAC TROMETHAMINE 15 MG: 30 INJECTION, SOLUTION INTRAMUSCULAR at 09:37

## 2022-03-23 RX ADMIN — HYDROMORPHONE HYDROCHLORIDE 0.5 MG: 1 INJECTION, SOLUTION INTRAMUSCULAR; INTRAVENOUS; SUBCUTANEOUS at 10:32

## 2022-03-23 RX ADMIN — LIDOCAINE HYDROCHLORIDE 100 MG: 20 INJECTION, SOLUTION INTRAVENOUS at 08:33

## 2022-03-23 RX ADMIN — PROPOFOL 180 MG: 10 INJECTION, EMULSION INTRAVENOUS at 08:33

## 2022-03-23 RX ADMIN — Medication 120 MG: at 08:33

## 2022-03-23 RX ADMIN — PHENYLEPHRINE HYDROCHLORIDE 200 MCG: 10 INJECTION, SOLUTION INTRAMUSCULAR; INTRAVENOUS; SUBCUTANEOUS at 09:31

## 2022-03-23 ASSESSMENT — PAIN SCALES - GENERAL
PAINLEVEL_OUTOF10: 4
PAINLEVEL_OUTOF10: 4
PAINLEVEL_OUTOF10: 0
PAINLEVEL_OUTOF10: 4
PAINLEVEL_OUTOF10: 7
PAINLEVEL_OUTOF10: 4

## 2022-03-23 ASSESSMENT — PULMONARY FUNCTION TESTS
PIF_VALUE: 27
PIF_VALUE: 16
PIF_VALUE: 12
PIF_VALUE: 16
PIF_VALUE: 12
PIF_VALUE: 16
PIF_VALUE: 28
PIF_VALUE: 16
PIF_VALUE: 12
PIF_VALUE: 16
PIF_VALUE: 15
PIF_VALUE: 25
PIF_VALUE: 6
PIF_VALUE: 1
PIF_VALUE: 16
PIF_VALUE: 4
PIF_VALUE: 17
PIF_VALUE: 16
PIF_VALUE: 12
PIF_VALUE: 16
PIF_VALUE: 1
PIF_VALUE: 16
PIF_VALUE: 0
PIF_VALUE: 26
PIF_VALUE: 16
PIF_VALUE: 16
PIF_VALUE: 20
PIF_VALUE: 16
PIF_VALUE: 25
PIF_VALUE: 16
PIF_VALUE: 2
PIF_VALUE: 16
PIF_VALUE: 16
PIF_VALUE: 15
PIF_VALUE: 16
PIF_VALUE: 5
PIF_VALUE: 16
PIF_VALUE: 15
PIF_VALUE: 16
PIF_VALUE: 14
PIF_VALUE: 24
PIF_VALUE: 1
PIF_VALUE: 29
PIF_VALUE: 20
PIF_VALUE: 16
PIF_VALUE: 0
PIF_VALUE: 12
PIF_VALUE: 16
PIF_VALUE: 16
PIF_VALUE: 2

## 2022-03-23 ASSESSMENT — PAIN DESCRIPTION - PAIN TYPE
TYPE: ACUTE PAIN
TYPE: SURGICAL PAIN
TYPE: ACUTE PAIN

## 2022-03-23 ASSESSMENT — PAIN DESCRIPTION - DESCRIPTORS
DESCRIPTORS: BURNING
DESCRIPTORS: BURNING

## 2022-03-23 ASSESSMENT — PAIN DESCRIPTION - FREQUENCY: FREQUENCY: CONTINUOUS

## 2022-03-23 ASSESSMENT — PAIN DESCRIPTION - LOCATION
LOCATION: BUTTOCKS
LOCATION: BACK
LOCATION: BUTTOCKS

## 2022-03-23 ASSESSMENT — PAIN DESCRIPTION - ORIENTATION
ORIENTATION: ANTERIOR
ORIENTATION: ANTERIOR

## 2022-03-23 NOTE — DISCHARGE INSTR - SUPPLEMENTARY INSTRUCTIONS
Following Bone Marrow [place]:    Post Operative Appointment:        You have a large pressure dressing on the back of your hips. This should stay in place until your follow up visit the day after the procedure. At that visit you will be assessed to make sure you are recovering as expected. At this visit the bandage is removed and the wounds are assessed and covered with Band-Aids. Do not shower until after this appointment. Discomfort and stiffness can last up to 10 days. The transplant team can provide you with documentation needed to miss work or school. How to Care for Yourself Following Bone Marrow [place]:    Go home and rest for a few days, keep your legs raised whenever possible. Take your pain medication before you absolutely need it. Be aware that some pain medications may cause nausea. Do not take it on an empty stomach. Be sure to report any side effects to your coordinator. Use only acetaminophen products as over-the-counter pain medication. These products will not prolong bleeding. Applying ice to the harvest sites for additional pain relief is also recommended. It is important to drink plenty of fluids preferably non-caffeine/non-alcoholic beverages. It takes time for your body to adapt to the loss of fluid. Continuing to move around without over-exerting yourself may help prevent stiffness in your back where the marrow was collected. You may experience difficulty in climbing stairs, lifting objects or bending over; do not push yourself to do any of these activities for several days. Bruises may continue to enlarge for a few days after the procedure. Do not expect to return to pre-collection activities for about two to three weeks, maybe longer for more strenuous activities. You may experience muscle pain and/or muscle fatigue in your back and legs. You may be more tired than usual for several days to weeks.   Plan to take at least a few days off from work; perhaps more if your job is physically demanding. If you have any questions or concerns regarding time off from work or school, please discuss them with your coordinator. Take advantage of the care and kindness offered by loved ones. Share the information provided for you regarding your care and recovery. You can always reach someone if you have questions or problems. During business hours (8 am-4:30 pm) you can call the [place] [address] office at [phone] and ask for one of the coordinators. After hours, on weekends and holidays you can reach the physician on call by calling [phone] ([phone]). Ask for the bone marrow transplant physician on call. Please return to the Outpatient Infusion Department 3rd [facility] 3/24/22 9:30 a.m. for a dressing change and lab work. Bri Patel RN Transplant Coordinator [phone]      Dr. Santos Greer suggests Jessica Mckeon take 325 mg Iron twice a day for one month post procedure.

## 2022-03-23 NOTE — FLOWSHEET NOTE
03/23/22 0815   Encounter Summary   Services provided to: Patient   Referral/Consult From: [facility]   (3/23, aretha )   Complexity of Encounter Moderate   Length of Encounter 15 minutes   Routine   Type Pre-procedure   Assessment Calm; Approachable; Hopeful   Intervention Active listening;Explored feelings, thoughts, concerns;Nurtured hope;Prayer   Outcome Comfort;Expressed gratitude;Engaged in conversation   \"Please keep me and my grandfather in prayer\"   Staff Yolanda Kirstie, [place]

## 2022-03-23 NOTE — PROGRESS NOTES
Pt arrived restless shivering CO of severe pain in buttocks report of 2L plus of fluid in OR and that Dr Scott Conroy wanted pacu to complete bag # 3 prior to DC home

## 2022-03-23 NOTE — ONCOLOGY
Bone Marrow Bud Procedure:    Before your surgery begins the nurse will start an IV to give you fluids before, during and after the procedure. You may also be given medication to make you sleepy before going into the operating room. Family members may remain with you until this time and then they will be asked to go to the Surgery Waiting Area. Once in the operating room, the nurses and doctors will be wearing masks. An anesthesiologist will give you medication through your IV to put you to sleep. Once you are asleep, a small tube will be placed down your windpipe (trachea) to maintain an open airway and assist your breathing. Then you will be turned over onto your stomach and covered with sterile drapes. The harvesting of your bone marrow is done by the insertion of a needle into the center of your hipbones. Bone marrow is then withdrawn through the needle. There will be 1 to 2 skin punctures (harvest sites) on each side of your pelvis. Once the procedure is finished, a large pressure bandage will be placed over the harvest sites to help prevent bleeding. Then you will be transferred to the recovery unit where your family can visit you. The entire hospital time for the bone marrow harvest should last 6-10 hours (allowing time for the induction of anesthesia, the harvest and recovery). You may also receive your own red blood cells back through your IV. Dont get up for the first time after the procedure without assistance, as you may experience dizziness. Ask for assistance from the nursing staff if you want to get up and walk around after donation. Even though you might be stiff and sore, many donors report feeling better if they are able to move around. You will be discharged from the recovery room after you are awake and alert, able to eat and drink, able to urinate and able to walk. You may want to bring a pillow for the car to cushion your lower back on the trip home. Bone Marrow [place] Procedure:    Before your surgery begins the nurse will start an IV to give you fluids before, during and after the procedure. You may also be given medication to make you sleepy before going into the operating room. Family members may remain with you until this time and then they will be asked to go to the Surgery Waiting Area. Once in the operating room, the nurses and doctors will be wearing masks. An anesthesiologist will give you medication through your IV to put you to sleep. Once you are asleep, a small tube will be placed down your windpipe (trachea) to maintain an open airway and assist your breathing. Then you will be turned over onto your stomach and covered with sterile drapes. The harvesting of your bone marrow is done by the insertion of a needle into the center of your hipbones. Bone marrow is then withdrawn through the needle. There will be 1 to 2 skin punctures (harvest sites) on each side of your pelvis. Once the procedure is finished, a large pressure bandage will be placed over the harvest sites to help prevent bleeding. Then you will be transferred to the recovery unit where your family can visit you. The entire hospital time for the bone marrow harvest should last 6-10 hours (allowing time for the induction of anesthesia, the harvest and recovery). You may also receive your own red blood cells back through your IV. Dont get up for the first time after the procedure without assistance, as you may experience dizziness. Ask for assistance from the nursing staff if you want to get up and walk around after donation. Even though you might be stiff and sore, many donors report feeling better if they are able to move around. You will be discharged from the recovery room after you are awake and alert, able to eat and drink, able to urinate and able to walk. You may want to bring a pillow for the car to cushion your lower back on the trip home.

## 2022-03-23 NOTE — H&P
Laura Chan    [id number]      Department of General Surgery    Surgical Services     Pre-operative History and Physical      INDICATION:   BONE MARROW DONOR    Procedure(s):  BONE MARROW HARVEST    History obtained from: Patient interview and EHR     HISTORY:   The patient is a 22 y.o. female with a past medical and surgical history as delineated below. 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 She presents today for bone marrow harvest.    Weight loss/gain:  No  Recent Hx Steroid use: No  Known anesthesia problems:  No  Recent history of abnormal bleeding: No  Remote history of abnormal bleeding:No  Covid 19:  Patient denies fever, chills, worsening cough, or known exposure to Covid-19. Past Medical History:    Past medical history reviewed. No pertinent past medical history. Past Surgical History:        Procedure Laterality Date    DENTAL SURGERY      as a toddler    HYMENOTOMY / HYMENECTOMY N/A 3/13/2020    EXCISE INTACT HYMEN performed by Arpan Rico MD at [place] [address]       Medications Prior to Admission:   None    Allergies:  Patient has no known allergies.       Family History:       Problem Relation Age of Onset    High Cholesterol Mother     No Known Problems Father     High Cholesterol Maternal Grandmother     Depression Paternal Grandmother     Arthritis Paternal Grandmother     Kidney Cancer Paternal Grandfather          REVIEW OF SYSTEMS:    Constitutional: Negative for chills, fatigue and fever   HENT: Negative for loss of hearing   Eyes: Negative for visual disturbance  Respiratory: Negative for shortness of breath and wheezing    Cardiovascular: Negative for chest pain, palpitations and exertional chest pressure  Gastrointestinal: Negative for constipation, diarrhea, nausea and vomiting   Musculoskeletal: Negative for gait problem, and joint swelling    Skin: Negative for rash and nonhealing wounds   Neurological: Negative for dizziness, syncope, light-headedness Hematological: Does not bruise/bleed easily   Psychiatric/Behavioral: Negative for agitation    PHYSICAL EXAM:      /67   Pulse 78   Temp 98 °F (36.7 °C) (Temporal)   Resp 15   Ht 5' 6\" (1.676 m)   Wt 188 lb 3.2 oz (85.4 kg)   SpO2 97%   BMI 30.38 kg/m²  I      Eyes:  pupils equal, round and reactive to light and conjunctiva normal    Head/ENT:  Normocephalic, without obvious abnormality, atramatic,     Neck:  Supple, symmetrical, trachea midline, no adenopathy, no tenderness, skin warm and dry. Heart: Regular rate and rhythm    Lungs:  No increased work of breathing, good air exchange, clear to auscultation bilaterally, no crackles or wheezing    Abdomen:  Soft, non-distended, non-tender, no masses palpated    Extremities:  No clubbing, cyanosis, or edema      ASSESSMENT AND PLAN:    1. Patient is a 22 y.o. female with above specified procedure planned. 2.  Access to ancillary services are available per request of the provider.     TAMMY Shell - DILLON   3/23/2022

## 2022-03-23 NOTE — ANESTHESIA POSTPROCEDURE EVALUATION
Department of Anesthesiology  Postprocedure Note    Patient: Apurva Albright  MRN: [id number]  YOB: 2000  Date of evaluation: 3/23/2022  Time:  11:18 AM     Procedure Summary     Date: 03/23/22 Room / Location: [facility] OR  / [facility]    Anesthesia Start: 1938 Anesthesia Stop: 1345    Procedure: BONE MARROW HARVEST (N/A ) Diagnosis: (BONE MARROW DONOR)    Surgeons: Yany Langford MD Responsible Provider: Denia Balderrama MD    Anesthesia Type: general ASA Status: 2          Anesthesia Type: general    Oscar Phase I: Oscar Score: 10    Oscar Phase II:      Last vitals: Reviewed and per EMR flowsheets.        Anesthesia Post Evaluation    Patient location during evaluation: PACU  Level of consciousness: awake  Complications: no  Multimodal analgesia pain management approach

## 2022-03-23 NOTE — H&P
3/23/22    Patient's H& P reviewed and there are no material changes as of this date. Pamula Schwab.  [facility], [address] [facility]  [address] [facility]

## 2022-03-23 NOTE — PROGRESS NOTES
Pt feeling \"hot\", blankets removed, ice pack placed on pt, pt then repositioned on left side per self

## 2022-03-23 NOTE — PROGRESS NOTES
PACU Transfer to [id number]    Vitals:    03/23/22 1345   BP: 117/65   Pulse: 82   Resp: 14   Temp: 97.4 °F    SpO2: 98%         Intake/Output Summary (Last 24 hours) at 3/23/2022 1408  Last data filed at 3/23/2022 1345  Gross per 24 hour   Intake 4320 ml   Output --   Net 4320 ml       Pain assessment:  present - adequately treated  Pain Level: 4    Patient transferred to care of JOHN RN.    3/23/2022 2:08 PM

## 2022-03-23 NOTE — PROGRESS NOTES
5 Pt much more alert now, expressed she feels much better and wants to go home.  Called Mom updated her and let her know she will be called soon to come to [facility] DC instructions

## 2022-03-23 NOTE — ONCOLOGY
Following Bone Marrow [place]:     Post Operative Appointment:          You have a large pressure dressing on the back of your hips. This should stay in place until your follow up visit the day after the procedure. At that visit you will be assessed to make sure you are recovering as expected. At this visit the bandage is removed and the wounds are assessed and covered with Band-Aids. Do not shower until after this appointment. Discomfort and stiffness can last up to 10 days. The transplant team can provide you with documentation needed to miss work or school.      How to Care for Yourself Following Bone Marrow [place]:       · Go home and rest for a few days, keep your legs raised whenever possible. · Take your pain medication before you absolutely need it. Be aware that some pain medications may cause nausea. Do not take it on an empty stomach. Be sure to report any side effects to your coordinator. · Use only acetaminophen products as over-the-counter pain medication. These products will not prolong bleeding. · Applying ice to the harvest sites for additional pain relief is also recommended. · It is important to drink plenty of fluids preferably non-caffeine/non-alcoholic beverages. It takes time for your body to adapt to the loss of fluid. · Continuing to move around without over-exerting yourself may help prevent stiffness in your back where the marrow was collected. · You may experience difficulty in climbing stairs, lifting objects or bending over; do not push yourself to do any of these activities for several days. · Bruises may continue to enlarge for a few days after the procedure. · Do not expect to return to pre-collection activities for about two to three weeks, maybe longer for more strenuous activities. · You may experience muscle pain and/or muscle fatigue in your back and legs. · You may be more tired than usual for several days to weeks.   · Plan to take at least a few days off from work; perhaps more if your job is physically demanding. If you have any questions or concerns regarding time off from work or school, please discuss them with your coordinator. · Take advantage of the care and kindness offered by loved ones. Share the information provided for you regarding your care and recovery.     You can always reach someone if you have questions or problems. During business hours (8 am-4:30 pm) you can call the [place] [address] office at [phone] and ask for one of the coordinators. After hours, on weekends and holidays you can reach the physician on call by calling [phone] ([phone]).  Ask for the bone marrow transplant physician on call.      Please return to the Outpatient Infusion Department 3rd [facility] 3/24/22 9:30 a.m. for a dressing change and lab work.        Vincenzo Morris RN Transplant Coordinator [phone]        Dr. Monster Castorena suggests Yareli Lobato take 325 mg Iron twice a day for one month post procedure.

## 2022-03-23 NOTE — PROGRESS NOTES
Pt was up to the BR with help on arrival to [facility]. Bilat legs were strong, pt was a little weak and had an increase in pain with movement. Pale color was pale and she stated she was nauseated. MEdicated with Zofran, given Tylenol for  Mild pain. Parents  At bedside. PT  And family were given instructions and had their Rx filled. Pt left per w/c to go upstairs with her parents to see her grandfather, the bone marrow recipient. No c/o.  Pt is to visit and go home to rest.

## 2022-03-23 NOTE — ANESTHESIA PRE PROCEDURE
Department of Anesthesiology  Preprocedure Note       Name:  Ashlee Moser   Age:  22 y.o.  :  2000                                          MRN:  [id number]         Date:  3/23/2022      Surgeon: Cyndee Petersen):  Jesse Sanders MD    Procedure: Procedure(s):  BONE MARROW HARVEST    Medications prior to admission:   Prior to Admission medications    Medication Sig Start Date End Date Taking?  Authorizing Provider   doxycycline hyclate (VIBRAMYCIN) 100 MG capsule Take 100 mg by mouth 2 times daily    Historical Provider, MD       Current medications:    Current Facility-Administered Medications   Medication Dose Route Frequency Provider Last Rate Last Admin    0.9 % sodium chloride infusion   IntraVENous Continuous Martita Houston MD        sodium chloride flush 0.9 % injection 5-40 mL  5-40 mL IntraVENous 2 times per day Martita Houston MD        sodium chloride flush 0.9 % injection 5-40 mL  5-40 mL IntraVENous PRN Martita Houston MD        0.9 % sodium chloride infusion  25 mL IntraVENous PRN Martita Houston MD        meperidine (DEMEROL) injection 12.5 mg  12.5 mg IntraVENous Q5 Min PRN Martita Houston MD        fentaNYL (SUBLIMAZE) injection 25 mcg  25 mcg IntraVENous Q5 Min PRN Martita Houston MD        HYDROmorphone (DILAUDID) injection 0.5 mg  0.5 mg IntraVENous Q5 Min PRN Martita Houston MD        oxyCODONE (ROXICODONE) immediate release tablet 5 mg  5 mg Oral PRN Martita Houston MD        Or    oxyCODONE (ROXICODONE) immediate release tablet 10 mg  10 mg Oral PRN Martita Houston MD        ondansetron [facility]) injection 4 mg  4 mg IntraVENous Once PRN Martita Houston MD        prochlorperazine (COMPAZINE) 5 mg in sodium chloride (PF) 10 mL injection  5 mg IntraVENous Once PRN Martita Houston MD        LORazepam (ATIVAN) injection 0.5 mg  0.5 mg IntraVENous Once PRN Martita Huoston MD        diphenhydrAMINE (BENADRYL) injection 12.5 mg  12.5 mg IntraVENous Once PRN Martita Houston MD  labetalol (NORMODYNE;TRANDATE) injection 10 mg  10 mg IntraVENous Q15 Min PRN John Holguin MD           Allergies:  No Known Allergies    Problem List:  There is no problem list on file for this patient. Past Medical History:  No past medical history on file. Past Surgical History:        Procedure Laterality Date    DENTAL SURGERY      as a toddler    HYMENOTOMY / HYMENECTOMY N/A 3/13/2020    EXCISE INTACT HYMEN performed by John Mckeon MD at [address] [place] [address] History:    Social History     Tobacco Use    Smoking status: Never Smoker    Smokeless tobacco: Never Used   Substance Use Topics    Alcohol use: Yes     Comment: weekends                                Counseling given: Not Answered      Vital Signs (Current): There were no vitals filed for this visit. BP Readings from Last 3 Encounters:   02/18/22 132/76   03/13/20 (!) 78/37   03/13/20 101/60       NPO Status:                                                                                 BMI:   Wt Readings from Last 3 Encounters:   02/18/22 188 lb 4.4 oz (85.4 kg)   03/13/20 154 lb 8 oz (70.1 kg) (86 %, Z= 1.09)*     * Growth percentiles are based on CDC (Girls, 2-20 Years) data.      There is no height or weight on file to calculate BMI.    CBC:   Lab Results   Component Value Date    WBC 6.0 02/18/2022    RBC 4.62 02/18/2022    HGB 13.9 02/18/2022    HCT 39.8 02/18/2022    MCV 86.2 02/18/2022    RDW 12.5 02/18/2022     02/18/2022       CMP:   Lab Results   Component Value Date     02/18/2022    K 3.9 02/18/2022     02/18/2022    CO2 24 02/18/2022    BUN 15 02/18/2022    CREATININE 0.6 02/18/2022    GFRAA >60 02/18/2022    AGRATIO 1.5 02/18/2022    LABGLOM >60 02/18/2022    GLUCOSE 120 02/18/2022    PROT 7.6 02/18/2022    CALCIUM 10.1 02/18/2022    BILITOT 0.3 02/18/2022    ALKPHOS 91 02/18/2022    AST 24 02/18/2022    ALT 27 02/18/2022       POC Tests: No results for input(s): POCGLU, POCNA, POCK, POCCL, POCBUN, POCHEMO, POCHCT in the last 72 hours. Coags:   Lab Results   Component Value Date    PROTIME 12.6 02/18/2022    INR 1.11 02/18/2022    APTT 32.6 02/18/2022       HCG (If Applicable): No results found for: PREGTESTUR, PREGSERUM, HCG, HCGQUANT     ABGs: No results found for: PHART, PO2ART, RMB4KIC, LZF9YMW, BEART, P5RQYLBF     Type & Screen (If Applicable):  No results found for: LABABO, LABRH    Drug/Infectious Status (If Applicable):  No results found for: HIV, HEPCAB    COVID-19 Screening (If Applicable): No results found for: COVID19        Anesthesia Evaluation    Airway: Mallampati: II  TM distance: >3 FB   Neck ROM: full  Mouth opening: > = 3 FB Dental:          Pulmonary:                              Cardiovascular:            Rhythm: regular  Rate: normal                    Neuro/Psych:   (+) neuromuscular disease:,             GI/Hepatic/Renal:             Endo/Other:                     Abdominal:             Vascular: Other Findings:             Anesthesia Plan      general     ASA 2       Induction: intravenous. Anesthetic plan and risks discussed with patient. Plan discussed with CRNA.                   Lida Conteh MD   3/23/2022

## 2022-03-23 NOTE — ONCOLOGY
This RN reviewed DC instructions and follow up appt with pt's mother. Prescription given to pt's mother. All questions answered to family's satisfaction.

## 2022-03-24 ENCOUNTER — HOSPITAL ENCOUNTER (OUTPATIENT)
Dept: ONCOLOGY | Age: 22
Setting detail: INFUSION SERIES
Discharge: HOME | End: 2022-03-24
Payer: COMMERCIAL

## 2022-03-24 VITALS
TEMPERATURE: 98.3 F | DIASTOLIC BLOOD PRESSURE: 71 MMHG | HEART RATE: 96 BPM | SYSTOLIC BLOOD PRESSURE: 104 MMHG | RESPIRATION RATE: 20 BRPM | OXYGEN SATURATION: 99 %

## 2022-03-24 LAB
BASOPHILS ABSOLUTE: 0 K/UL (ref 0–0.2)
BASOPHILS RELATIVE PERCENT: 0.5 %
EOSINOPHILS ABSOLUTE: 0 K/UL (ref 0–0.6)
EOSINOPHILS RELATIVE PERCENT: 0.1 %
HCT VFR BLD CALC: 23.9 % (ref 36–48)
HEMOGLOBIN: 8.1 G/DL (ref 12–16)
LYMPHOCYTES ABSOLUTE: 2.9 K/UL (ref 1–5.1)
LYMPHOCYTES RELATIVE PERCENT: 37.2 %
MCH RBC QN AUTO: 29.1 PG (ref 26–34)
MCHC RBC AUTO-ENTMCNC: 34.1 G/DL (ref 31–36)
MCV RBC AUTO: 85.1 FL (ref 80–100)
MONOCYTES ABSOLUTE: 0.5 K/UL (ref 0–1.3)
MONOCYTES RELATIVE PERCENT: 6.2 %
NEUTROPHILS ABSOLUTE: 4.4 K/UL (ref 1.7–7.7)
NEUTROPHILS RELATIVE PERCENT: 56 %
PDW BLD-RTO: 12.4 % (ref 12.4–15.4)
PLATELET # BLD: 229 K/UL (ref 135–450)
PMV BLD AUTO: 9 FL (ref 5–10.5)
RBC # BLD: 2.8 M/UL (ref 4–5.2)
WBC # BLD: 7.8 K/UL (ref 4–11)

## 2022-03-24 PROCEDURE — 85025 COMPLETE CBC W/AUTO DIFF WBC: CPT

## 2022-03-24 PROCEDURE — 99211 OFF/OP EST MAY X REQ PHY/QHP: CPT

## 2022-03-24 ASSESSMENT — PAIN DESCRIPTION - ONSET
ONSET_3: ON-GOING
ONSET_2: ON-GOING
ONSET: ON-GOING

## 2022-03-24 ASSESSMENT — PAIN DESCRIPTION - DESCRIPTORS
DESCRIPTORS_2: ACHING;SORE
DESCRIPTORS_3: SORE
DESCRIPTORS: ACHING

## 2022-03-24 ASSESSMENT — PAIN DESCRIPTION - INTENSITY
RATING_2: 4
RATING_3: 4

## 2022-03-24 ASSESSMENT — PAIN DESCRIPTION - PAIN TYPE
TYPE_2: ACUTE PAIN
TYPE_3: ACUTE PAIN
TYPE: SURGICAL PAIN

## 2022-03-24 ASSESSMENT — PAIN DESCRIPTION - PROGRESSION
CLINICAL_PROGRESSION: GRADUALLY IMPROVING
CLINICAL_PROGRESSION_3: GRADUALLY IMPROVING
CLINICAL_PROGRESSION_2: GRADUALLY IMPROVING

## 2022-03-24 ASSESSMENT — PAIN - FUNCTIONAL ASSESSMENT: PAIN_FUNCTIONAL_ASSESSMENT: PREVENTS OR INTERFERES SOME ACTIVE ACTIVITIES AND ADLS

## 2022-03-24 ASSESSMENT — PAIN DESCRIPTION - FREQUENCY: FREQUENCY: CONTINUOUS

## 2022-03-24 ASSESSMENT — PAIN DESCRIPTION - LOCATION
LOCATION_3: ABDOMEN
LOCATION_2: NECK
LOCATION: BACK

## 2022-03-24 ASSESSMENT — PAIN DESCRIPTION - ORIENTATION
ORIENTATION: LOWER
ORIENTATION_3: MID
ORIENTATION_2: POSTERIOR

## 2022-03-24 ASSESSMENT — PAIN DESCRIPTION - DURATION
DURATION_3: CONTINUOUS
DURATION_2: CONTINUOUS

## 2022-03-24 ASSESSMENT — PAIN SCALES - GENERAL: PAINLEVEL_OUTOF10: 5

## 2022-03-24 NOTE — PROGRESS NOTES
Siren dressing change completed with Sigrid Samuels RN transplant coordinator. Pressure dressing removed, harvest incision sites cleaned with chg and gauze/bandaids applied. Bilateral sites clean, no redness, no drainage. Patient with moderate pain in abdomen, throat, neck, and bilateral iliac crests. Patient given ice pack and advised to take pain medication as prescribed by MD. CBC collected by laboratory personnel. VSS. Encouraged to drink plenty of fluids. Patient departed stable via wheelchair with sister.      Porfirio Brady RN

## 2022-03-24 NOTE — PROCEDURES
BONE MARROW HARVEST OPERATIVE REPORT    Adalberto Esteban   2000   [id number]     3/24/2022     Pre - Operative Diagnosis:  Bone marrow transplant donor Allogeniec donor   Post - Operative Diagnosis:   Bone marrow transplant donor Allogeneic donor    Operation / Procedure:  Bone marrow harvest OR Bilateral bone marrow biopsies and marrow harvest    Surgeon:  Dr Neha Holloway, Dr Milly Scheuermann    Assistant:  Cande Posada   Anesthesia:  General Anesthesia         Description of Procedure:  After induction of general anesthesia, the patient was placed in the prone position, prepped and draped in the usual sterile manner. A time out was performed to verify patient, location(s), and procedure. Using 8 guage marrow harvest needles, multiple bone marrow aspirations were performed from the right and left posterior iliac crest.  Multiple skin penetrations were made on each side through which multiple bone marrow aspirations were obtained directing the needle to different areas of he marrow cavity. During this 45  minute procedure, a total volume of [volume collected less heparin] mls was collected and mixed during the sampling with [ml of heparin] mls diluent media containing 50 U of preservative free heparin per ml. During the procedure no units of irradiated, CMV negative blood was transfused. Furthermore, the patient received 3 L of LR     IV toradol 30 mg     Findings:    Estimated Blood Loss:  1300 mls. Fluid Replacement:  3000 mls, [type of crystalloid]  Specimens Obtained:  None   Cultures Obtained:  None     After awakening from general anesthesia, the patient was transferred to PACU in stable condition. The patient was awakened from anesthetic, was extubated and was taken to the  recovery room in stable condition.

## 2022-03-24 NOTE — ONCOLOGY
Bilateral iliac crest incisions unremarkable with scant drainage. Reviewed signs and symptoms of infection and aware to call coordinator with any concerns. As previously noted, encouraged to push fluids and continue ibuprofen or pain meds as needed. CBC results reported to Dr. Dilan Martines. Patient took first dose of oral iron (325mg) last night.  Aware to continue x 30 days

## 2022-03-31 ENCOUNTER — NURSE ONLY (OUTPATIENT)
Dept: ONCOLOGY | Age: 22
End: 2022-03-31

## 2022-03-31 NOTE — ONCOLOGY
This RN called patient to follow up post BM [place] last week. No complaints at this time. Pt instructed to call office, [facility], with any new problems.

## 2024-11-07 ENCOUNTER — HOSPITAL ENCOUNTER (OUTPATIENT)
Dept: ONCOLOGY | Age: 24
Setting detail: INFUSION SERIES
Discharge: HOME | End: 2024-11-07
Payer: COMMERCIAL

## 2024-11-07 VITALS
WEIGHT: 209.66 LBS | HEIGHT: 67 IN | HEART RATE: 96 BPM | BODY MASS INDEX: 32.91 KG/M2 | SYSTOLIC BLOOD PRESSURE: 140 MMHG | TEMPERATURE: 97.7 F | DIASTOLIC BLOOD PRESSURE: 87 MMHG | OXYGEN SATURATION: 99 % | RESPIRATION RATE: 18 BRPM

## 2024-11-07 LAB
ALBUMIN SERPL-MCNC: 4.6 G/DL (ref 3.4–5)
ALBUMIN/GLOB SERPL: 1.5 {RATIO} (ref 1.1–2.2)
ALP SERPL-CCNC: 71 U/L (ref 40–129)
ALT SERPL-CCNC: 33 U/L (ref 10–40)
ANION GAP SERPL CALCULATED.3IONS-SCNC: 10 MMOL/L (ref 3–16)
APTT BLD: 27.7 SEC (ref 22.1–36.4)
AST SERPL-CCNC: 28 U/L (ref 15–37)
BASOPHILS # BLD: 0.1 K/UL (ref 0–0.2)
BASOPHILS NFR BLD: 1.1 %
BILIRUB DIRECT SERPL-MCNC: 0.2 MG/DL (ref 0–0.3)
BILIRUB INDIRECT SERPL-MCNC: 0.1 MG/DL (ref 0–1)
BILIRUB SERPL-MCNC: 0.3 MG/DL (ref 0–1)
BILIRUB UR QL STRIP.AUTO: NEGATIVE
BUN SERPL-MCNC: 13 MG/DL (ref 7–20)
CALCIUM SERPL-MCNC: 9.8 MG/DL (ref 8.3–10.6)
CHLORIDE SERPL-SCNC: 104 MMOL/L (ref 99–110)
CLARITY UR: CLEAR
CO2 SERPL-SCNC: 24 MMOL/L (ref 21–32)
COLOR UR: YELLOW
CREAT SERPL-MCNC: 0.6 MG/DL (ref 0.6–1.1)
DEPRECATED RDW RBC AUTO: 12.6 % (ref 12.4–15.4)
EOSINOPHIL # BLD: 0.1 K/UL (ref 0–0.6)
EOSINOPHIL NFR BLD: 1.3 %
EPI CELLS #/AREA URNS HPF: NORMAL /HPF (ref 0–5)
GFR SERPLBLD CREATININE-BSD FMLA CKD-EPI: >90 ML/MIN/{1.73_M2}
GLUCOSE SERPL-MCNC: 107 MG/DL (ref 70–99)
GLUCOSE UR STRIP.AUTO-MCNC: NEGATIVE MG/DL
HAV IGM SERPL QL IA: NORMAL
HBV SURFACE AB SERPL IA-ACNC: <3.5 MIU/ML
HCG SERPL QL: NEGATIVE
HCT VFR BLD AUTO: 40 % (ref 36–48)
HCV AB SERPL QL IA: NORMAL
HGB BLD-MCNC: 13.6 G/DL (ref 12–16)
HGB UR QL STRIP.AUTO: ABNORMAL
INR PPP: 0.96 (ref 0.85–1.15)
KETONES UR STRIP.AUTO-MCNC: NEGATIVE MG/DL
LDH SERPL L TO P-CCNC: 184 U/L (ref 100–190)
LEUKOCYTE ESTERASE UR QL STRIP.AUTO: NEGATIVE
LYMPHOCYTES # BLD: 1.7 K/UL (ref 1–5.1)
LYMPHOCYTES NFR BLD: 32.1 %
MCH RBC QN AUTO: 30.8 PG (ref 26–34)
MCHC RBC AUTO-ENTMCNC: 34 G/DL (ref 31–36)
MCV RBC AUTO: 90.6 FL (ref 80–100)
MONOCYTES # BLD: 0.3 K/UL (ref 0–1.3)
MONOCYTES NFR BLD: 5.1 %
NEUTROPHILS # BLD: 3.1 K/UL (ref 1.7–7.7)
NEUTROPHILS NFR BLD: 60.4 %
NITRITE UR QL STRIP.AUTO: NEGATIVE
PH UR STRIP.AUTO: 6 [PH] (ref 5–8)
PHOSPHATE SERPL-MCNC: 3.1 MG/DL (ref 2.5–4.9)
PLATELET # BLD AUTO: 290 K/UL (ref 135–450)
PMV BLD AUTO: 9.5 FL (ref 5–10.5)
POTASSIUM SERPL-SCNC: 3.8 MMOL/L (ref 3.5–5.1)
PROT SERPL-MCNC: 7.7 G/DL (ref 6.4–8.2)
PROT UR STRIP.AUTO-MCNC: NEGATIVE MG/DL
PROTHROMBIN TIME: 13 SEC (ref 11.9–14.9)
RBC # BLD AUTO: 4.42 M/UL (ref 4–5.2)
RBC #/AREA URNS HPF: NORMAL /HPF (ref 0–4)
SODIUM SERPL-SCNC: 138 MMOL/L (ref 136–145)
SP GR UR STRIP.AUTO: 1.02 (ref 1–1.03)
SPECIMEN SOURCE: NORMAL
UA COMPLETE W REFLEX CULTURE PNL UR: ABNORMAL
UA DIPSTICK W REFLEX MICRO PNL UR: YES
URATE SERPL-MCNC: 6.1 MG/DL (ref 2.6–6)
URN SPEC COLLECT METH UR: ABNORMAL
UROBILINOGEN UR STRIP-ACNC: 0.2 E.U./DL
WBC # BLD AUTO: 5.2 K/UL (ref 4–11)
WBC #/AREA URNS HPF: NORMAL /HPF (ref 0–5)

## 2024-11-07 PROCEDURE — 86644 CMV ANTIBODY: CPT

## 2024-11-07 PROCEDURE — 86803 HEPATITIS C AB TEST: CPT

## 2024-11-07 PROCEDURE — 86706 HEP B SURFACE ANTIBODY: CPT

## 2024-11-07 PROCEDURE — 86645 CMV ANTIBODY IGM: CPT

## 2024-11-07 PROCEDURE — 86778 TOXOPLASMA ANTIBODY IGM: CPT

## 2024-11-07 PROCEDURE — 86695 HERPES SIMPLEX TYPE 1 TEST: CPT

## 2024-11-07 PROCEDURE — 83615 LACTATE (LD) (LDH) ENZYME: CPT

## 2024-11-07 PROCEDURE — 84703 CHORIONIC GONADOTROPIN ASSAY: CPT

## 2024-11-07 PROCEDURE — 86787 VARICELLA-ZOSTER ANTIBODY: CPT

## 2024-11-07 PROCEDURE — 86665 EPSTEIN-BARR CAPSID VCA: CPT

## 2024-11-07 PROCEDURE — 84100 ASSAY OF PHOSPHORUS: CPT

## 2024-11-07 PROCEDURE — 84550 ASSAY OF BLOOD/URIC ACID: CPT

## 2024-11-07 PROCEDURE — 86663 EPSTEIN-BARR ANTIBODY: CPT

## 2024-11-07 PROCEDURE — 86664 EPSTEIN-BARR NUCLEAR ANTIGEN: CPT

## 2024-11-07 PROCEDURE — 86707 HEPATITIS BE ANTIBODY: CPT

## 2024-11-07 PROCEDURE — 85610 PROTHROMBIN TIME: CPT

## 2024-11-07 PROCEDURE — 86696 HERPES SIMPLEX TYPE 2 TEST: CPT

## 2024-11-07 PROCEDURE — 87529 HSV DNA AMP PROBE: CPT

## 2024-11-07 PROCEDURE — 86709 HEPATITIS A IGM ANTIBODY: CPT

## 2024-11-07 PROCEDURE — 80053 COMPREHEN METABOLIC PANEL: CPT

## 2024-11-07 PROCEDURE — 85025 COMPLETE CBC W/AUTO DIFF WBC: CPT

## 2024-11-07 PROCEDURE — 86777 TOXOPLASMA ANTIBODY: CPT

## 2024-11-07 PROCEDURE — 81001 URINALYSIS AUTO W/SCOPE: CPT

## 2024-11-07 PROCEDURE — 82248 BILIRUBIN DIRECT: CPT

## 2024-11-07 PROCEDURE — 85730 THROMBOPLASTIN TIME PARTIAL: CPT

## 2024-11-07 ASSESSMENT — PAIN SCALES - GENERAL: PAINLEVEL_OUTOF10: 0

## 2024-11-07 NOTE — PROGRESS NOTES
use a bedpan or urinal. We recommend you urinate prior to being connected to the apheresis machine.    Possible Side Effects of Stem Cell Collection (Apheresis)    Pain and bruising at the needle insertion sites  Infection at the insertion sites  Nausea  Tingling feeling around the mouth, fingers, toes and mild muscle cramps sometimes occur as a side effect of the anti-coagulant used in the apheresis tubing. Temporarily slowing the procedure down or administering calcium supplements treats these symptoms.  Occasionally there may be a loss of platelets in the blood cell separator causing an increased chance of bleeding or bruising.  More uncommon side effects include blood clot formation or nerve injury at the needle insertion sites, and fainting due to lowered blood pressure.   Prior to the stem cell collection [facility] will have you sign a consent outlining potential side effects.    You can contact your coordinator with questions or concerns at [phone].

## 2024-11-08 LAB
HERPES SIMPLEX VIRUS 1 IGG: POSITIVE
HERPES SIMPLEX VIRUS 2 IGG: NEGATIVE

## 2024-11-09 LAB
CMV IGG SERPL IA-ACNC: <0.2 U/ML
CMV IGM SERPL IA-ACNC: <8 AU/ML
EBV EA-D IGG SER-ACNC: 12.4 U/ML (ref 0–10.9)
EBV NA IGG SER IA-ACNC: >600 U/ML (ref 0–21.9)
EBV VCA IGG SER IA-ACNC: 529 U/ML (ref 0–21.9)
EBV VCA IGM SER IA-ACNC: <10 U/ML (ref 0–43.9)
HBV E AB SERPL QL IA: NEGATIVE
T GONDII IGG SER-ACNC: <3 IU/ML
T GONDII IGM SER-ACNC: <3 AU/ML

## 2024-11-10 LAB
VZV IGG SER IA-ACNC: 4.9 S/CO
VZV IGM SER IA-ACNC: 0 ISR

## 2024-11-11 LAB
HSV1 DNA CSF QL NAA+PROBE: NOT DETECTED
HSV2 DNA CSF QL NAA+PROBE: NOT DETECTED
SPECIMEN SOURCE: NORMAL

## 2024-12-01 PROBLEM — Z52.001 STEM CELL DONOR: Status: ACTIVE | Noted: 2024-12-01

## 2024-12-01 NOTE — H&P
BCC History and Physical        Attending Physician: No att. providers found    Primary Care: Jeanette Gonzalez MD       Referring MD: No referring provider defined for this encounter.    Name: Heydi Romano :  2000  MRN:  [id number]    Admission: (Not on file)      Date: 2024        Dx: Healthy Donor    Indication: Anticipation of stem cell transplant for related donor     Day + 1 of stem cell collection     S: Pt feels well with the exception of mild weakness and fatigue. The remainder of her ros is neg as noted below.       O : Vital Signs reviewed:     There were no vitals taken for this visit..        Past Surgical History:   Procedure Laterality Date    BONE MARROW HARVEST N/A 3/23/2022    BONE MARROW HARVEST performed by Timmy Blood MD at [facility] OR    DENTAL SURGERY      as a toddler    HYMENOTOMY / HYMENECTOMY N/A 3/13/2020    EXCISE INTACT HYMEN performed by Leisa Torres MD at [facility] OR       Past Medical History:   Diagnosis Date    Bone marrow donor 2022       Prior to Admission medications    Medication Sig Start Date End Date Taking? Authorizing Provider   medroxyPROGESTERone (DEPO-PROVERA) 150 MG/ML injection Inject 150 mg into the muscle every 3 months    Provider, MD Irene       No Known Allergies    Family History   Problem Relation Age of Onset    High Cholesterol Mother     No Known Problems Father     High Cholesterol Maternal Grandmother     Cancer Maternal Grandfather 64        Bone    Depression Paternal Grandmother     Arthritis Paternal Grandmother     Kidney Cancer Paternal Grandfather         Social History     Socioeconomic History    Marital status: Single     Spouse name: Not on file    Number of children: Not on file    Years of education: Not on file    Highest education level: Not on file   Occupational History    Not on file   Tobacco Use    Smoking status: Never    Smokeless tobacco: Never   Vaping Use    Vaping status: Never Used

## 2024-12-02 ENCOUNTER — HOSPITAL ENCOUNTER (OUTPATIENT)
Dept: ONCOLOGY | Age: 24
Setting detail: INFUSION SERIES
Discharge: HOME | End: 2024-12-02
Payer: MEDICARE

## 2024-12-02 DIAGNOSIS — Z52.001 STEM CELL DONOR: Primary | ICD-10-CM

## 2024-12-02 LAB
ALBUMIN SERPL-MCNC: 4.3 G/DL (ref 3.4–5)
ALBUMIN/GLOB SERPL: 1.4 {RATIO} (ref 1.1–2.2)
ALP SERPL-CCNC: 68 U/L (ref 40–129)
ALT SERPL-CCNC: 36 U/L (ref 10–40)
ANION GAP SERPL CALCULATED.3IONS-SCNC: 11 MMOL/L (ref 3–16)
AST SERPL-CCNC: 30 U/L (ref 15–37)
BASOPHILS # BLD: 0 K/UL (ref 0–0.2)
BASOPHILS # BLD: 0.1 K/UL (ref 0–0.2)
BASOPHILS NFR BLD: 0.5 %
BASOPHILS NFR BLD: 1 %
BILIRUB SERPL-MCNC: 0.4 MG/DL (ref 0–1)
BUN SERPL-MCNC: 13 MG/DL (ref 7–20)
CALCIUM SERPL-MCNC: 9.4 MG/DL (ref 8.3–10.6)
CHLORIDE SERPL-SCNC: 106 MMOL/L (ref 99–110)
CO2 SERPL-SCNC: 21 MMOL/L (ref 21–32)
CREAT SERPL-MCNC: 0.6 MG/DL (ref 0.6–1.1)
DEPRECATED RDW RBC AUTO: 12.6 % (ref 12.4–15.4)
DEPRECATED RDW RBC AUTO: 12.8 % (ref 12.4–15.4)
EOSINOPHIL # BLD: 0.1 K/UL (ref 0–0.6)
EOSINOPHIL # BLD: 0.1 K/UL (ref 0–0.6)
EOSINOPHIL NFR BLD: 1.1 %
EOSINOPHIL NFR BLD: 1.5 %
GFR SERPLBLD CREATININE-BSD FMLA CKD-EPI: >90 ML/MIN/{1.73_M2}
GLUCOSE SERPL-MCNC: 98 MG/DL (ref 70–99)
HCG SERPL QL: NEGATIVE
HCT VFR BLD AUTO: 35.5 % (ref 36–48)
HCT VFR BLD AUTO: 39.8 % (ref 36–48)
HGB BLD-MCNC: 12.1 G/DL (ref 12–16)
HGB BLD-MCNC: 13.6 G/DL (ref 12–16)
LYMPHOCYTES # BLD: 1.3 K/UL (ref 1–5.1)
LYMPHOCYTES # BLD: 2.6 K/UL (ref 1–5.1)
LYMPHOCYTES NFR BLD: 23.8 %
LYMPHOCYTES NFR BLD: 37.4 %
MAGNESIUM SERPL-MCNC: 1.71 MG/DL (ref 1.8–2.4)
MCH RBC QN AUTO: 30.4 PG (ref 26–34)
MCH RBC QN AUTO: 30.7 PG (ref 26–34)
MCHC RBC AUTO-ENTMCNC: 34 G/DL (ref 31–36)
MCHC RBC AUTO-ENTMCNC: 34.2 G/DL (ref 31–36)
MCV RBC AUTO: 89.5 FL (ref 80–100)
MCV RBC AUTO: 89.8 FL (ref 80–100)
MONOCYTES # BLD: 0.2 K/UL (ref 0–1.3)
MONOCYTES # BLD: 0.4 K/UL (ref 0–1.3)
MONOCYTES NFR BLD: 3.3 %
MONOCYTES NFR BLD: 5.5 %
NEUTROPHILS # BLD: 3.8 K/UL (ref 1.7–7.7)
NEUTROPHILS # BLD: 3.9 K/UL (ref 1.7–7.7)
NEUTROPHILS NFR BLD: 54.6 %
NEUTROPHILS NFR BLD: 71.3 %
PLATELET # BLD AUTO: 109 K/UL (ref 135–450)
PLATELET # BLD AUTO: 263 K/UL (ref 135–450)
PMV BLD AUTO: 7.6 FL (ref 5–10.5)
PMV BLD AUTO: 9.5 FL (ref 5–10.5)
POTASSIUM SERPL-SCNC: 3.8 MMOL/L (ref 3.5–5.1)
PROT SERPL-MCNC: 7.3 G/DL (ref 6.4–8.2)
RBC # BLD AUTO: 3.96 M/UL (ref 4–5.2)
RBC # BLD AUTO: 4.43 M/UL (ref 4–5.2)
SODIUM SERPL-SCNC: 138 MMOL/L (ref 136–145)
WBC # BLD AUTO: 5.5 K/UL (ref 4–11)
WBC # BLD AUTO: 7 K/UL (ref 4–11)

## 2024-12-02 PROCEDURE — 85025 COMPLETE CBC W/AUTO DIFF WBC: CPT

## 2024-12-02 PROCEDURE — 86780 TREPONEMA PALLIDUM: CPT | Performed by: INTERNAL MEDICINE

## 2024-12-02 PROCEDURE — 86644 CMV ANTIBODY: CPT | Performed by: INTERNAL MEDICINE

## 2024-12-02 PROCEDURE — 83735 ASSAY OF MAGNESIUM: CPT

## 2024-12-02 PROCEDURE — 86790 VIRUS ANTIBODY NOS: CPT | Performed by: INTERNAL MEDICINE

## 2024-12-02 PROCEDURE — 87798 DETECT AGENT NOS DNA AMP: CPT | Performed by: INTERNAL MEDICINE

## 2024-12-02 PROCEDURE — 84703 CHORIONIC GONADOTROPIN ASSAY: CPT

## 2024-12-02 PROCEDURE — 86704 HEP B CORE ANTIBODY TOTAL: CPT | Performed by: INTERNAL MEDICINE

## 2024-12-02 PROCEDURE — 87516 HEPATITIS B DNA AMP PROBE: CPT | Performed by: INTERNAL MEDICINE

## 2024-12-02 PROCEDURE — 87535 HIV-1 PROBE&REVERSE TRNSCRPJ: CPT | Performed by: INTERNAL MEDICINE

## 2024-12-02 PROCEDURE — 87521 HEPATITIS C PROBE&RVRS TRNSC: CPT | Performed by: INTERNAL MEDICINE

## 2024-12-02 PROCEDURE — 38205 HARVEST ALLOGENEIC STEM CELL: CPT | Performed by: INTERNAL MEDICINE

## 2024-12-02 PROCEDURE — 80053 COMPREHEN METABOLIC PANEL: CPT

## 2024-12-02 PROCEDURE — 86703 HIV-1/HIV-2 1 RESULT ANTBDY: CPT | Performed by: INTERNAL MEDICINE

## 2024-12-02 PROCEDURE — 86753 PROTOZOA ANTIBODY NOS: CPT | Performed by: INTERNAL MEDICINE

## 2024-12-02 PROCEDURE — 87340 HEPATITIS B SURFACE AG IA: CPT | Performed by: INTERNAL MEDICINE

## 2024-12-02 PROCEDURE — 86803 HEPATITIS C AB TEST: CPT | Performed by: INTERNAL MEDICINE

## 2024-12-02 RX ORDER — PROCHLORPERAZINE EDISYLATE 5 MG/ML
10 INJECTION INTRAMUSCULAR; INTRAVENOUS EVERY 6 HOURS PRN
OUTPATIENT
Start: 2024-12-03

## 2024-12-02 RX ORDER — LOPERAMIDE HYDROCHLORIDE 2 MG/1
2 CAPSULE ORAL PRN
OUTPATIENT
Start: 2024-12-03

## 2024-12-02 RX ORDER — CALCIUM GLUCONATE 20 MG/ML
2000 INJECTION, SOLUTION INTRAVENOUS ONCE
Status: DISCONTINUED | OUTPATIENT
Start: 2024-12-02 | End: 2024-12-03 | Stop reason: HOSPADM

## 2024-12-02 RX ORDER — PROCHLORPERAZINE MALEATE 10 MG
10 TABLET ORAL EVERY 6 HOURS PRN
OUTPATIENT
Start: 2024-12-03

## 2024-12-02 RX ORDER — CALCIUM GLUCONATE 20 MG/ML
2000 INJECTION, SOLUTION INTRAVENOUS ONCE
OUTPATIENT
Start: 2024-12-03 | End: 2024-12-03

## 2024-12-02 RX ORDER — PROCHLORPERAZINE EDISYLATE 5 MG/ML
10 INJECTION INTRAMUSCULAR; INTRAVENOUS EVERY 6 HOURS PRN
Status: DISCONTINUED | OUTPATIENT
Start: 2024-12-02 | End: 2024-12-03 | Stop reason: HOSPADM

## 2024-12-02 RX ORDER — LOPERAMIDE HYDROCHLORIDE 2 MG/1
2 CAPSULE ORAL PRN
Status: DISCONTINUED | OUTPATIENT
Start: 2024-12-02 | End: 2024-12-03 | Stop reason: HOSPADM

## 2024-12-02 RX ORDER — PROCHLORPERAZINE MALEATE 10 MG
10 TABLET ORAL EVERY 6 HOURS PRN
Status: DISCONTINUED | OUTPATIENT
Start: 2024-12-02 | End: 2024-12-03 | Stop reason: HOSPADM

## 2024-12-02 NOTE — PROGRESS NOTES
Pt arrived to [facility] today for scheduled apheresis procedure.  Labs drawn prior to Apheresis per Iraj Hong RN. Education & post lab draws all completed by Iraj Hong RN.  Review [facility] documentation for details.

## (undated) DEVICE — BONE MARROW HARVEST NEEDLE 11GA X 4IN: Brand: BONE MARROW HARVEST NEEDLE

## (undated) DEVICE — STERILE LATEX POWDER-FREE SURGICAL GLOVESWITH NITRILE COATING: Brand: PROTEXIS

## (undated) DEVICE — SOLUTION PREP POVIDONE IOD FOR SKIN MUCOUS MEM PRIOR TO

## (undated) DEVICE — TOWEL,OR,DSP,ST,BLUE,DLX,8/PK,10PK/CS: Brand: MEDLINE

## (undated) DEVICE — SOLUTION IV IRRIG POUR BRL 0.9% SODIUM CHL 2F7124

## (undated) DEVICE — Z DUP USE 2270994 CATHETER URETH 14FR L16IN RED RUB INTMIT RND HLLW TIP 2 OPP

## (undated) DEVICE — ADAPTERS DOUBLE FEMALE LL ADAPTER SPECIAL ADAPTER TO CONVERT A MALE FITTING TO FEMALE LL: Brand: ADAPTERS DOUBLE FEMALE LL ADAPTER

## (undated) DEVICE — COLLECTION KIT BON MARROW

## (undated) DEVICE — PROTECTOR ULN NRV PUR FOAM HK LOOP STRP ANATOMICALLY

## (undated) DEVICE — SET ADMIN PRIMING 7ML L30IN 7.35LB 20 GTT 2ND RLER CLMP

## (undated) DEVICE — TOWEL,STOP FLAG GOLD N-W: Brand: MEDLINE

## (undated) DEVICE — CAUTERY TIP POLISHER: Brand: DEVON

## (undated) DEVICE — TUBING, SUCTION, 1/4" X 12', STRAIGHT: Brand: MEDLINE

## (undated) DEVICE — SYRINGE MED 50ML LUERSLIP TIP

## (undated) DEVICE — PAD,NON-ADHERENT,3X8,STERILE,LF,1/PK: Brand: MEDLINE

## (undated) DEVICE — PAD,ABDOMINAL,5"X9",ST,LF,25/BX: Brand: MEDLINE INDUSTRIES, INC.

## (undated) DEVICE — POSITIONER HD SFT TCH BERRY FOAM DEVON

## (undated) DEVICE — GENERAL: Brand: MEDLINE INDUSTRIES, INC.

## (undated) DEVICE — GOWN,SIRUS,POLYRNF,BRTHSLV,XL,30/CS: Brand: MEDLINE

## (undated) DEVICE — SET ADMIN 25ML L117IN PMP MOD CK VLV RLER CLMP 2 SMRTSITE

## (undated) DEVICE — CANISTER, RIGID, 1200CC: Brand: MEDLINE INDUSTRIES, INC.

## (undated) DEVICE — DRAPE,T,LAPARO,TRANS,STERILE: Brand: MEDLINE

## (undated) DEVICE — APPLICATOR MEDICATED 26 CC SOLUTION HI LT ORNG CHLORAPREP

## (undated) DEVICE — Z DISCONTINUED BY MEDLINE USE 2711682 TRAY SKIN PREP DRY W/ PREM GLV

## (undated) DEVICE — MINOR SET UP PK

## (undated) DEVICE — SOLUTION SCRB 4OZ 10% POVIDONE IOD ANTIMIC BTL

## (undated) DEVICE — COVER LT HNDL BLU PLAS

## (undated) DEVICE — CYSTO/BLADDER IRRIGATION SET, REGULATING CLAMP

## (undated) DEVICE — GOWN SIRUS NONREIN XL W/TWL: Brand: MEDLINE INDUSTRIES, INC.

## (undated) DEVICE — GAUZE,SPONGE,4"X4",16PLY,XRAY,STRL,LF: Brand: MEDLINE

## (undated) DEVICE — SUTURE VCRL SZ 4-0 L27IN ABSRB UD L26MM SH 1/2 CIR J415H

## (undated) DEVICE — GLOVE SURG SZ 75 L12IN FNGR THK94MIL WHT POLYMER LTX BEAD

## (undated) DEVICE — PAD SANITARY MTRN TAB BELT WRP NS 11IN

## (undated) DEVICE — DRAPE,UNDERBUTTOCKS,PCH,STERILE: Brand: MEDLINE

## (undated) DEVICE — SOLUTION ANTICOAG CITRATE D PLSTC 500ML

## (undated) DEVICE — GLOVE ORANGE PI 7 1/2   MSG9075

## (undated) DEVICE — PACK,LAPARASCOPY,PELVISCOPY,AURORA: Brand: MEDLINE

## (undated) DEVICE — GLOVE ORANGE PI 7   MSG9070

## (undated) DEVICE — 1010 S-DRAPE TOWEL DRAPE 10/BX: Brand: STERI-DRAPE™

## (undated) DEVICE — UNDERPAD INCONT XL MESH PROTCT + DISP FOR MAT USE

## (undated) DEVICE — COVER,TABLE,HEAVY DUTY,77"X90",STRL: Brand: MEDLINE

## (undated) DEVICE — MERCY HEALTH WEST TURNOVER: Brand: MEDLINE INDUSTRIES, INC.

## (undated) DEVICE — COVER,MAYO STAND,XL,STERILE: Brand: MEDLINE

## (undated) DEVICE — SET EXTN PRIMING 4.9ML L30IN INCL SLDE CLMP SPIN M LUERLOCK

## (undated) DEVICE — POSITIONER HD REST FOAM CMFRT TCH

## (undated) DEVICE — BLANKET WRM W29.9XL79.1IN UP BODY FORC AIR MISTRAL-AIR

## (undated) DEVICE — STERILE SURGICAL LUBRICANT, METAL TUBE: Brand: SURGILUBE